# Patient Record
Sex: MALE | Race: WHITE
[De-identification: names, ages, dates, MRNs, and addresses within clinical notes are randomized per-mention and may not be internally consistent; named-entity substitution may affect disease eponyms.]

---

## 2018-08-07 PROBLEM — Z00.00 ENCOUNTER FOR PREVENTIVE HEALTH EXAMINATION: Status: ACTIVE | Noted: 2018-08-07

## 2018-08-30 ENCOUNTER — RX RENEWAL (OUTPATIENT)
Age: 73
End: 2018-08-30

## 2018-09-11 ENCOUNTER — RX RENEWAL (OUTPATIENT)
Age: 73
End: 2018-09-11

## 2018-10-08 ENCOUNTER — APPOINTMENT (OUTPATIENT)
Dept: ENDOCRINOLOGY | Facility: CLINIC | Age: 73
End: 2018-10-08
Payer: MEDICARE

## 2018-10-08 VITALS
WEIGHT: 211 LBS | OXYGEN SATURATION: 98 % | HEART RATE: 62 BPM | SYSTOLIC BLOOD PRESSURE: 120 MMHG | HEIGHT: 69 IN | BODY MASS INDEX: 31.25 KG/M2 | DIASTOLIC BLOOD PRESSURE: 70 MMHG

## 2018-10-08 DIAGNOSIS — I25.10 ATHEROSCLEROTIC HEART DISEASE OF NATIVE CORONARY ARTERY W/OUT ANGINA PECTORIS: ICD-10-CM

## 2018-10-08 LAB — GLUCOSE BLDC GLUCOMTR-MCNC: 127

## 2018-10-08 PROCEDURE — 99214 OFFICE O/P EST MOD 30 MIN: CPT

## 2018-10-17 ENCOUNTER — RX RENEWAL (OUTPATIENT)
Age: 73
End: 2018-10-17

## 2018-10-18 ENCOUNTER — RX RENEWAL (OUTPATIENT)
Age: 73
End: 2018-10-18

## 2018-10-28 ENCOUNTER — RX RENEWAL (OUTPATIENT)
Age: 73
End: 2018-10-28

## 2019-01-10 ENCOUNTER — RECORD ABSTRACTING (OUTPATIENT)
Age: 74
End: 2019-01-10

## 2019-01-10 DIAGNOSIS — Z86.39 PERSONAL HISTORY OF OTHER ENDOCRINE, NUTRITIONAL AND METABOLIC DISEASE: ICD-10-CM

## 2019-01-10 DIAGNOSIS — Z87.39 PERSONAL HISTORY OF OTHER DISEASES OF THE MUSCULOSKELETAL SYSTEM AND CONNECTIVE TISSUE: ICD-10-CM

## 2019-01-10 DIAGNOSIS — I51.9 HEART DISEASE, UNSPECIFIED: ICD-10-CM

## 2019-01-17 ENCOUNTER — APPOINTMENT (OUTPATIENT)
Dept: ENDOCRINOLOGY | Facility: CLINIC | Age: 74
End: 2019-01-17
Payer: MEDICARE

## 2019-01-17 VITALS
WEIGHT: 212 LBS | HEIGHT: 69 IN | SYSTOLIC BLOOD PRESSURE: 120 MMHG | BODY MASS INDEX: 31.4 KG/M2 | DIASTOLIC BLOOD PRESSURE: 70 MMHG

## 2019-01-17 LAB
GLUCOSE BLDC GLUCOMTR-MCNC: 191
HBA1C MFR BLD HPLC: 6.9
LDLC SERPL DIRECT ASSAY-MCNC: 49
MICROALBUMIN/CREAT 24H UR-RTO: <0.2

## 2019-01-17 PROCEDURE — 82962 GLUCOSE BLOOD TEST: CPT

## 2019-01-17 PROCEDURE — 99214 OFFICE O/P EST MOD 30 MIN: CPT | Mod: 25

## 2019-01-17 NOTE — HISTORY OF PRESENT ILLNESS
[FreeTextEntry1] : Quality:  Type 2.   \par Severity:  Moderate\par Duration:  11 years\par Associated Symptoms:   \par Notes:  Current regimen:\par 	glipizide ER 5 daily\par 	metformin  4/day\par 	victoza 1.8 - tried to stop victoza but did not feel well\par 	humalog variable dosing; around 10 units before meals (5 pens every 6 weeks)\par \par elevated glucose overnight; somewhat improved when injecting humalog HS\par \par Blood Glucose Testing Information \par \par Patient is using the  meter and is testing 6-7 times per day.\par \par \par Past Medical History\par Arthritis. \par Notes:  ASHD, CABG, MI. Low ejection fraction; advised to have defibrillator (refused by pt). Ejection fraction improving.\par hypothyroid on .137 daily\par BPH\par has cut down on quinapril from 20 bid to 1/2 of a 20 mg pill once daily\par

## 2019-01-17 NOTE — REVIEW OF SYSTEMS
[Chest Pain] : chest pain [Shortness Of Breath] : shortness of breath [FreeTextEntry5] : following up with cardiology [FreeTextEntry6] : with cold weather

## 2019-01-24 ENCOUNTER — RX RENEWAL (OUTPATIENT)
Age: 74
End: 2019-01-24

## 2019-02-04 ENCOUNTER — RX RENEWAL (OUTPATIENT)
Age: 74
End: 2019-02-04

## 2019-03-18 ENCOUNTER — RX RENEWAL (OUTPATIENT)
Age: 74
End: 2019-03-18

## 2019-04-08 ENCOUNTER — RX RENEWAL (OUTPATIENT)
Age: 74
End: 2019-04-08

## 2019-04-08 DIAGNOSIS — I25.10 ATHEROSCLEROTIC HEART DISEASE OF NATIVE CORONARY ARTERY W/OUT ANGINA PECTORIS: ICD-10-CM

## 2019-04-08 DIAGNOSIS — N40.0 BENIGN PROSTATIC HYPERPLASIA WITHOUT LOWER URINARY TRACT SYMPMS: ICD-10-CM

## 2019-05-10 ENCOUNTER — APPOINTMENT (OUTPATIENT)
Dept: ENDOCRINOLOGY | Facility: CLINIC | Age: 74
End: 2019-05-10
Payer: MEDICARE

## 2019-05-10 VITALS
SYSTOLIC BLOOD PRESSURE: 120 MMHG | BODY MASS INDEX: 31.4 KG/M2 | WEIGHT: 212 LBS | HEIGHT: 69 IN | DIASTOLIC BLOOD PRESSURE: 60 MMHG | HEART RATE: 64 BPM

## 2019-05-10 LAB
GLUCOSE BLDC GLUCOMTR-MCNC: 266
HBA1C MFR BLD HPLC: 6.5
LDLC SERPL CALC-MCNC: 42

## 2019-05-10 PROCEDURE — 99214 OFFICE O/P EST MOD 30 MIN: CPT | Mod: 25

## 2019-05-10 PROCEDURE — 82962 GLUCOSE BLOOD TEST: CPT

## 2019-05-10 NOTE — REVIEW OF SYSTEMS
[Fatigue] : fatigue [Lower Ext Edema] : no lower extremity edema [Shortness Of Breath] : no shortness of breath

## 2019-07-01 ENCOUNTER — RX RENEWAL (OUTPATIENT)
Age: 74
End: 2019-07-01

## 2019-07-16 ENCOUNTER — RX RENEWAL (OUTPATIENT)
Age: 74
End: 2019-07-16

## 2019-09-06 LAB
HBA1C MFR BLD HPLC: 6.5
LDLC SERPL DIRECT ASSAY-MCNC: 46
MICROALBUMIN/CREAT 24H UR-RTO: 4

## 2019-09-09 ENCOUNTER — APPOINTMENT (OUTPATIENT)
Dept: ENDOCRINOLOGY | Facility: CLINIC | Age: 74
End: 2019-09-09
Payer: MEDICARE

## 2019-09-09 VITALS
SYSTOLIC BLOOD PRESSURE: 100 MMHG | WEIGHT: 214 LBS | DIASTOLIC BLOOD PRESSURE: 74 MMHG | BODY MASS INDEX: 31.7 KG/M2 | OXYGEN SATURATION: 99 % | HEIGHT: 69 IN | HEART RATE: 72 BPM

## 2019-09-09 LAB — GLUCOSE BLDC GLUCOMTR-MCNC: 214

## 2019-09-09 PROCEDURE — 99214 OFFICE O/P EST MOD 30 MIN: CPT | Mod: 25

## 2019-09-09 PROCEDURE — 82962 GLUCOSE BLOOD TEST: CPT

## 2019-09-09 RX ORDER — GLIPIZIDE 5 MG/1
5 TABLET, EXTENDED RELEASE ORAL
Refills: 0 | Status: DISCONTINUED | COMMUNITY
End: 2019-09-09

## 2019-09-09 RX ORDER — INSULIN LISPRO 100 [IU]/ML
100 INJECTION, SOLUTION INTRAVENOUS; SUBCUTANEOUS
Qty: 15 | Refills: 3 | Status: DISCONTINUED | COMMUNITY
Start: 2018-10-17 | End: 2019-09-09

## 2019-09-09 NOTE — ASSESSMENT
[FreeTextEntry1] : DM type 2, treated with insulin, orals, and glp-1, well controlled with no severe hypoglycemia\par Hyperlipidemia, with triglyceride elevation but good control of LDL. \par Hypothyroid, euthyroid on replacement\par \par

## 2019-09-09 NOTE — HISTORY OF PRESENT ILLNESS
[FreeTextEntry1] : Quality:  Type 2.   \par Severity:  Moderate\par Duration:  11 years\par Associated Symptoms:   \par Notes:  Current regimen:\par 	glipizide ER 5 daily\par 	metformin  4/day\par 	victoza 1.8 - tried to stop victoza but did not feel well\par 	humalog variable dosing; around 10 units before meals (5 pens every 6 weeks)\par \par elevated glucose overnight; somewhat improved when injecting humalog HS\par \par Blood Glucose Testing Information \par \par Patient is using the  meter and is testing 6-7 times per day. Not interested in a valerio device.\par \par \par Past Medical History\par Arthritis. \par Notes:  ASHD, CABG, MI. Low ejection fraction; advised to have defibrillator (refused by pt). Ejection fraction improving. Followed by Dr. Odell\par hypothyroid on .137 daily\par BPH\par has cut down on quinapril from 20 bid to 1/2 of a 20 mg pill once daily\par

## 2019-11-20 ENCOUNTER — RX RENEWAL (OUTPATIENT)
Age: 74
End: 2019-11-20

## 2019-12-05 ENCOUNTER — APPOINTMENT (OUTPATIENT)
Dept: ENDOCRINOLOGY | Facility: CLINIC | Age: 74
End: 2019-12-05
Payer: MEDICARE

## 2019-12-05 ENCOUNTER — RESULT CHARGE (OUTPATIENT)
Age: 74
End: 2019-12-05

## 2019-12-05 VITALS
HEART RATE: 65 BPM | WEIGHT: 218 LBS | HEIGHT: 69 IN | DIASTOLIC BLOOD PRESSURE: 64 MMHG | BODY MASS INDEX: 32.29 KG/M2 | OXYGEN SATURATION: 98 % | SYSTOLIC BLOOD PRESSURE: 110 MMHG

## 2019-12-05 LAB
GLUCOSE BLDC GLUCOMTR-MCNC: 190
HBA1C MFR BLD HPLC: 6.9
LDLC SERPL CALC-MCNC: 54
MICROALBUMIN/CREAT 24H UR-RTO: 8

## 2019-12-05 PROCEDURE — 99214 OFFICE O/P EST MOD 30 MIN: CPT | Mod: 25

## 2019-12-05 PROCEDURE — 82962 GLUCOSE BLOOD TEST: CPT

## 2019-12-05 NOTE — ASSESSMENT
[FreeTextEntry1] : DM type 2, treated with insulin, orals, and glp-1, well controlled with no severe hypoglycemia. Advised increased exercise as tolerated\par Hyperlipidemia, with triglyceride elevation but good control of LDL. \par Hypothyroid, euthyroid on replacement\par \par

## 2019-12-05 NOTE — REVIEW OF SYSTEMS
[Recent Weight Gain (___ Lbs)] : recent [unfilled] ~Ulb weight gain [Chest Pain] : no chest pain [Shortness Of Breath] : no shortness of breath

## 2019-12-23 ENCOUNTER — RX RENEWAL (OUTPATIENT)
Age: 74
End: 2019-12-23

## 2020-03-09 ENCOUNTER — APPOINTMENT (OUTPATIENT)
Dept: ENDOCRINOLOGY | Facility: CLINIC | Age: 75
End: 2020-03-09
Payer: MEDICARE

## 2020-03-09 VITALS
SYSTOLIC BLOOD PRESSURE: 110 MMHG | BODY MASS INDEX: 31.99 KG/M2 | DIASTOLIC BLOOD PRESSURE: 70 MMHG | HEART RATE: 64 BPM | WEIGHT: 216 LBS | HEIGHT: 69 IN

## 2020-03-09 LAB
GLUCOSE BLDC GLUCOMTR-MCNC: 194
HBA1C MFR BLD HPLC: 6.7
LDLC SERPL DIRECT ASSAY-MCNC: 44

## 2020-03-09 PROCEDURE — 99214 OFFICE O/P EST MOD 30 MIN: CPT | Mod: 25

## 2020-03-09 PROCEDURE — 82962 GLUCOSE BLOOD TEST: CPT

## 2020-03-09 NOTE — REVIEW OF SYSTEMS
[Chest Pain] : chest pain [Shortness Of Breath] : no shortness of breath [FreeTextEntry5] : chronic stable angina

## 2020-03-09 NOTE — HISTORY OF PRESENT ILLNESS
[FreeTextEntry1] : Quality:  Type 2.   \par Severity:  Moderate\par Duration:  12 years\par Associated Symptoms:   \par Notes:  Current regimen:\par 	glipizide ER 5 daily\par 	metformin  4/day\par 	victoza 1.8 - tried to stop victoza but did not feel well\par 	humalog variable dosing; around 10 units before meals (5 pens every 6 weeks)\par \par elevated glucose overnight; somewhat improved when injecting humalog HS\par \par Blood Glucose Testing Information \par \par Patient is using the  meter and is testing 6-7 times per day. Not interested in a valerio device.\par \par \par Past Medical History\par Arthritis. \par Notes:  ASHD, CABG, MI. Low ejection fraction; advised to have defibrillator (refused by pt). Ejection fraction improving. Followed by Dr. Odell\par hypothyroid on .137 daily\par BPH\par has cut down on quinapril from 20 bid to 1/2 of a 20 mg pill once daily\par

## 2020-03-09 NOTE — ASSESSMENT
[FreeTextEntry1] : DM type 2, treated with insulin, orals, and glp-1, well controlled with no severe hypoglycemia.\par Hyperlipidemia, with triglyceride elevation but good control of LDL. \par Hypothyroid, euthyroid on replacement\par \par

## 2020-03-18 ENCOUNTER — RX RENEWAL (OUTPATIENT)
Age: 75
End: 2020-03-18

## 2020-06-01 ENCOUNTER — RX RENEWAL (OUTPATIENT)
Age: 75
End: 2020-06-01

## 2020-06-09 ENCOUNTER — APPOINTMENT (OUTPATIENT)
Dept: ENDOCRINOLOGY | Facility: CLINIC | Age: 75
End: 2020-06-09
Payer: MEDICARE

## 2020-06-09 PROCEDURE — 36415 COLL VENOUS BLD VENIPUNCTURE: CPT

## 2020-06-10 LAB
ALBUMIN SERPL ELPH-MCNC: 4.7 G/DL
ALP BLD-CCNC: 93 U/L
ALT SERPL-CCNC: 14 U/L
ANION GAP SERPL CALC-SCNC: 13 MMOL/L
AST SERPL-CCNC: 14 U/L
BASOPHILS # BLD AUTO: 0.07 K/UL
BASOPHILS NFR BLD AUTO: 0.9 %
BILIRUB SERPL-MCNC: 0.3 MG/DL
BUN SERPL-MCNC: 14 MG/DL
CALCIUM SERPL-MCNC: 9.4 MG/DL
CHLORIDE SERPL-SCNC: 98 MMOL/L
CHOLEST SERPL-MCNC: 152 MG/DL
CHOLEST/HDLC SERPL: 2.2 RATIO
CO2 SERPL-SCNC: 25 MMOL/L
CREAT SERPL-MCNC: 1.01 MG/DL
CREAT SPEC-SCNC: 163 MG/DL
EOSINOPHIL # BLD AUTO: 0.08 K/UL
EOSINOPHIL NFR BLD AUTO: 1 %
ESTIMATED AVERAGE GLUCOSE: 140 MG/DL
GLUCOSE SERPL-MCNC: 168 MG/DL
HBA1C MFR BLD HPLC: 6.5 %
HCT VFR BLD CALC: 39.3 %
HDLC SERPL-MCNC: 70 MG/DL
HGB BLD-MCNC: 12 G/DL
IMM GRANULOCYTES NFR BLD AUTO: 0.4 %
LDLC SERPL CALC-MCNC: 38 MG/DL
LYMPHOCYTES # BLD AUTO: 2.23 K/UL
LYMPHOCYTES NFR BLD AUTO: 28.1 %
MAN DIFF?: NORMAL
MCHC RBC-ENTMCNC: 30.4 PG
MCHC RBC-ENTMCNC: 30.5 GM/DL
MCV RBC AUTO: 99.5 FL
MICROALBUMIN 24H UR DL<=1MG/L-MCNC: <1.2 MG/DL
MICROALBUMIN/CREAT 24H UR-RTO: NORMAL MG/G
MONOCYTES # BLD AUTO: 0.51 K/UL
MONOCYTES NFR BLD AUTO: 6.4 %
NEUTROPHILS # BLD AUTO: 5.02 K/UL
NEUTROPHILS NFR BLD AUTO: 63.2 %
PLATELET # BLD AUTO: 234 K/UL
POTASSIUM SERPL-SCNC: 5.4 MMOL/L
PROT SERPL-MCNC: 6.5 G/DL
RBC # BLD: 3.95 M/UL
RBC # FLD: 13.7 %
SODIUM SERPL-SCNC: 137 MMOL/L
TRIGL SERPL-MCNC: 219 MG/DL
TSH SERPL-ACNC: 1 UIU/ML
WBC # FLD AUTO: 7.94 K/UL

## 2020-08-17 ENCOUNTER — RX RENEWAL (OUTPATIENT)
Age: 75
End: 2020-08-17

## 2020-09-08 ENCOUNTER — APPOINTMENT (OUTPATIENT)
Dept: ENDOCRINOLOGY | Facility: CLINIC | Age: 75
End: 2020-09-08
Payer: MEDICARE

## 2020-09-08 PROCEDURE — 36415 COLL VENOUS BLD VENIPUNCTURE: CPT

## 2020-09-09 ENCOUNTER — LABORATORY RESULT (OUTPATIENT)
Age: 75
End: 2020-09-09

## 2020-09-09 LAB
ALBUMIN SERPL ELPH-MCNC: 4.6 G/DL
ALP BLD-CCNC: 99 U/L
ALT SERPL-CCNC: 17 U/L
ANION GAP SERPL CALC-SCNC: 12 MMOL/L
AST SERPL-CCNC: 17 U/L
BASOPHILS # BLD AUTO: 0.05 K/UL
BASOPHILS NFR BLD AUTO: 0.6 %
BILIRUB SERPL-MCNC: 0.4 MG/DL
BUN SERPL-MCNC: 15 MG/DL
CALCIUM SERPL-MCNC: 9.1 MG/DL
CHLORIDE SERPL-SCNC: 101 MMOL/L
CHOLEST SERPL-MCNC: 162 MG/DL
CHOLEST/HDLC SERPL: 2 RATIO
CO2 SERPL-SCNC: 25 MMOL/L
CREAT SERPL-MCNC: 1.04 MG/DL
EOSINOPHIL # BLD AUTO: 0.1 K/UL
EOSINOPHIL NFR BLD AUTO: 1.2 %
ESTIMATED AVERAGE GLUCOSE: 137 MG/DL
GLUCOSE SERPL-MCNC: 139 MG/DL
HBA1C MFR BLD HPLC: 6.4 %
HCT VFR BLD CALC: 41.8 %
HDLC SERPL-MCNC: 81 MG/DL
HGB BLD-MCNC: 12.6 G/DL
IMM GRANULOCYTES NFR BLD AUTO: 0.4 %
LDLC SERPL CALC-MCNC: 43 MG/DL
LYMPHOCYTES # BLD AUTO: 2.59 K/UL
LYMPHOCYTES NFR BLD AUTO: 30.8 %
MAN DIFF?: NORMAL
MCHC RBC-ENTMCNC: 30 PG
MCHC RBC-ENTMCNC: 30.1 GM/DL
MCV RBC AUTO: 99.5 FL
MONOCYTES # BLD AUTO: 0.56 K/UL
MONOCYTES NFR BLD AUTO: 6.7 %
NEUTROPHILS # BLD AUTO: 5.08 K/UL
NEUTROPHILS NFR BLD AUTO: 60.3 %
PLATELET # BLD AUTO: 208 K/UL
POTASSIUM SERPL-SCNC: 4.9 MMOL/L
PROT SERPL-MCNC: 6.7 G/DL
PSA SERPL-MCNC: 1.31 NG/ML
RBC # BLD: 4.2 M/UL
RBC # FLD: 13.5 %
SODIUM SERPL-SCNC: 138 MMOL/L
T3RU NFR SERPL: 0.9 TBI
T4 SERPL-MCNC: 7.1 UG/DL
TRIGL SERPL-MCNC: 192 MG/DL
TSH SERPL-ACNC: 1.48 UIU/ML
WBC # FLD AUTO: 8.41 K/UL

## 2020-09-21 ENCOUNTER — APPOINTMENT (OUTPATIENT)
Dept: ENDOCRINOLOGY | Facility: CLINIC | Age: 75
End: 2020-09-21
Payer: MEDICARE

## 2020-09-21 VITALS
BODY MASS INDEX: 31.84 KG/M2 | HEART RATE: 70 BPM | HEIGHT: 69 IN | WEIGHT: 215 LBS | SYSTOLIC BLOOD PRESSURE: 110 MMHG | DIASTOLIC BLOOD PRESSURE: 74 MMHG

## 2020-09-21 LAB — GLUCOSE BLDC GLUCOMTR-MCNC: 219

## 2020-09-21 PROCEDURE — 99214 OFFICE O/P EST MOD 30 MIN: CPT | Mod: 25

## 2020-09-21 PROCEDURE — 82962 GLUCOSE BLOOD TEST: CPT

## 2020-09-21 NOTE — PHYSICAL EXAM
[Clear to Auscultation] : lungs were clear to auscultation bilaterally [Normal Rate] : heart rate was normal

## 2020-09-21 NOTE — HISTORY OF PRESENT ILLNESS
[FreeTextEntry1] : Quality:  Type 2.   \par Severity:  Moderate\par Duration:  12 years\par Associated Symptoms:   \par Notes:  Current regimen:\par 	glipizide ER 5 daily\par 	metformin  4/day\par 	victoza 1.8 - tried to stop victoza but did not feel well\par 	humalog variable dosing; around 10 units before meals (5 pens every 6 weeks)\par \par elevated glucose overnight; somewhat improved when injecting humalog HS\par \par Blood Glucose Testing Information \par \par Patient is using the  meter and is testing 6-7 times per day. Usiing a valerio, which appears to read lower than fingersticks - however using outdated strips\par \par \par Past Medical History\par Arthritis. \par Notes:  ASHD, CABG, MI. Low ejection fraction; advised to have defibrillator (refused by pt). Ejection fraction improving. Followed by Dr. Odell\par hypothyroid on .137 daily\par BPH\par has cut down on quinapril from 20 bid to 1/2 of a 20 mg pill once daily\par

## 2020-09-21 NOTE — REVIEW OF SYSTEMS
[Chest Pain] : chest pain [Shortness Of Breath] : no shortness of breath [FreeTextEntry5] : stable anginal pattern

## 2020-09-21 NOTE — ASSESSMENT
[FreeTextEntry1] : DM type 2, treated with insulin, orals, and glp-1, well controlled with no severe hypoglycemia. decrease lunch injection advised\par Hyperlipidemia, with triglyceride elevation but good control of LDL. \par Hypothyroid, euthyroid on replacement\par \par

## 2020-11-05 ENCOUNTER — RX RENEWAL (OUTPATIENT)
Age: 75
End: 2020-11-05

## 2020-12-16 ENCOUNTER — APPOINTMENT (OUTPATIENT)
Dept: ENDOCRINOLOGY | Facility: CLINIC | Age: 75
End: 2020-12-16

## 2021-02-05 ENCOUNTER — RX RENEWAL (OUTPATIENT)
Age: 76
End: 2021-02-05

## 2021-02-11 ENCOUNTER — RX RENEWAL (OUTPATIENT)
Age: 76
End: 2021-02-11

## 2021-02-25 ENCOUNTER — RX RENEWAL (OUTPATIENT)
Age: 76
End: 2021-02-25

## 2021-03-23 LAB
HBA1C MFR BLD HPLC: 6.4
LDLC SERPL DIRECT ASSAY-MCNC: 62
MICROALBUMIN/CREAT 24H UR-RTO: 4

## 2021-03-24 ENCOUNTER — APPOINTMENT (OUTPATIENT)
Dept: ENDOCRINOLOGY | Facility: CLINIC | Age: 76
End: 2021-03-24
Payer: MEDICARE

## 2021-03-24 VITALS
WEIGHT: 220 LBS | BODY MASS INDEX: 32.58 KG/M2 | HEART RATE: 74 BPM | DIASTOLIC BLOOD PRESSURE: 60 MMHG | OXYGEN SATURATION: 99 % | SYSTOLIC BLOOD PRESSURE: 118 MMHG | HEIGHT: 69 IN

## 2021-03-24 LAB — GLUCOSE BLDC GLUCOMTR-MCNC: 179

## 2021-03-24 PROCEDURE — 82962 GLUCOSE BLOOD TEST: CPT

## 2021-03-24 PROCEDURE — 99214 OFFICE O/P EST MOD 30 MIN: CPT | Mod: 25

## 2021-03-24 PROCEDURE — 99072 ADDL SUPL MATRL&STAF TM PHE: CPT

## 2021-03-24 NOTE — ASSESSMENT
[FreeTextEntry1] : DM type 2, treated with insulin, orals, and glp-1, well controlled with no severe hypoglycemia. \par Hyperlipidemia, with triglyceride elevation but good control of LDL. \par Hypothyroid, euthyroid on replacement\par \par

## 2021-03-24 NOTE — HISTORY OF PRESENT ILLNESS
[FreeTextEntry1] : Quality:  Type 2.   \par Severity:  Moderate\par Duration:  13 years\par Associated Symptoms:   \par Notes:  Current regimen:\par 	glipizide ER 5 daily\par 	metformin  4/day\par 	victoza 1.8 - tried to stop victoza but did not feel well\par 	humalog variable dosing; around 10-25 units before meals (5 pens every 6 weeks)\par \par elevated glucose overnight; somewhat improved when injecting humalog HS\par \par Blood Glucose Testing Information \par \par Patient is using the  meter and is testing 6-7 times per day. Usiing a valerio, which appears to read lower than fingersticks - however using outdated strips\par \par \par Past Medical History\par Arthritis. \par Notes:  ASHD, CABG, MI. Low ejection fraction; advised to have defibrillator (refused by pt). Ejection fraction improving. Followed by Dr. Odell\par hypothyroid on .137 daily\par BPH\par has cut down on quinapril from 20 bid to 1/2 of a 20 mg pill once daily\par

## 2021-03-29 ENCOUNTER — RX RENEWAL (OUTPATIENT)
Age: 76
End: 2021-03-29

## 2021-04-13 ENCOUNTER — RX RENEWAL (OUTPATIENT)
Age: 76
End: 2021-04-13

## 2021-06-28 ENCOUNTER — RX RENEWAL (OUTPATIENT)
Age: 76
End: 2021-06-28

## 2021-06-28 RX ORDER — NADOLOL 40 MG/1
40 TABLET ORAL
Qty: 90 | Refills: 3 | Status: ACTIVE | COMMUNITY
Start: 2019-01-24 | End: 1900-01-01

## 2021-07-23 LAB
HBA1C MFR BLD HPLC: 5.7
LDLC SERPL DIRECT ASSAY-MCNC: 51

## 2021-07-26 ENCOUNTER — APPOINTMENT (OUTPATIENT)
Dept: ENDOCRINOLOGY | Facility: CLINIC | Age: 76
End: 2021-07-26
Payer: MEDICARE

## 2021-07-26 VITALS
WEIGHT: 220 LBS | HEIGHT: 69 IN | HEART RATE: 78 BPM | OXYGEN SATURATION: 98 % | BODY MASS INDEX: 32.58 KG/M2 | SYSTOLIC BLOOD PRESSURE: 110 MMHG | DIASTOLIC BLOOD PRESSURE: 64 MMHG

## 2021-07-26 LAB — GLUCOSE BLDC GLUCOMTR-MCNC: 125

## 2021-07-26 PROCEDURE — 99214 OFFICE O/P EST MOD 30 MIN: CPT | Mod: 25

## 2021-07-26 PROCEDURE — 95251 CONT GLUC MNTR ANALYSIS I&R: CPT

## 2021-07-26 PROCEDURE — 99072 ADDL SUPL MATRL&STAF TM PHE: CPT

## 2021-07-26 PROCEDURE — 82962 GLUCOSE BLOOD TEST: CPT

## 2021-07-26 NOTE — ASSESSMENT
[FreeTextEntry1] : DM type 2, treated with insulin, orals, and glp-1, well controlled with no severe hypoglycemia. ADvised more cautious use of insulin\par Hyperlipidemia, with triglyceride elevation but good control of LDL. \par Hypothyroid, euthyroid on replacement\par \par

## 2021-07-26 NOTE — HISTORY OF PRESENT ILLNESS
[Continuous Glucose Monitoring] : Continuous Glucose Monitoring: Yes [Isabella] : Isabella [FreeTextEntry1] : Quality:  Type 2.   \par Severity:  Moderate\par Duration:  13 years\par Associated Symptoms:   \par Notes:  Current regimen:\par 	glipizide ER 5 daily\par 	metformin  4/day\par 	victoza 1.8 - tried to stop victoza but did not feel well\par 	humalog variable dosing; around 10-25 units before meals (5 pens every 6 weeks)\par \par elevated glucose overnight; somewhat improved when injecting humalog HS\par \par Blood Glucose Testing Information \par \par Patient is using the  meter and is testing 6-7 times per day. Usiing a valerio, which appears to read lower than fingersticks - however using outdated strips\par \par \par Past Medical History\par Arthritis. \par Notes:  ASHD, CABG, MI. Low ejection fraction; advised to have defibrillator (refused by pt). Ejection fraction improving. Followed by Dr. Odell\par hypothyroid on .137 daily\par BPH\par has cut down on quinapril from 20 bid to 1/2 of a 20 mg pill once daily\par  [FreeTextEntry2] : 83 [FreeTextEntry3] : 13 [FreeTextEntry4] : 4 [FreeTextEntry5] : 137 [de-identified] : 6.6 [FreeTextEntry6] : 31

## 2021-07-27 NOTE — HISTORY OF PRESENT ILLNESS
[FreeTextEntry1] : Quality:  Type 2.   \par Severity:  Moderate\par Duration:  11 years\par Associated Symptoms:   \par Notes:  Current regimen:\par 	glipizide ER 5 daily\par 	metformin  4/day\par 	victoza 1.8 - tried to stop victoza but did not feel well\par 	humalog variable dosing; around 10 units before meals (5 pens every 6 weeks)\par \par elevated glucose overnight; somewhat improved when injecting humalog HS\par \par Blood Glucose Testing Information \par \par Patient is using the  meter and is testing 6-7 times per day. Not interested in a valerio device.\par \par \par Past Medical History\par Arthritis. \par Notes:  ASHD, CABG, MI. Low ejection fraction; advised to have defibrillator (refused by pt). Ejection fraction improving. Followed by Dr. Odell\par hypothyroid on .137 daily\par BPH\par has cut down on quinapril from 20 bid to 1/2 of a 20 mg pill once daily\par  27-Jul-2021 05:43

## 2021-08-04 ENCOUNTER — RX RENEWAL (OUTPATIENT)
Age: 76
End: 2021-08-04

## 2021-11-05 LAB
HBA1C MFR BLD HPLC: 5.9
LDLC SERPL DIRECT ASSAY-MCNC: 50
MICROALBUMIN/CREAT 24H UR-RTO: 5

## 2021-11-08 ENCOUNTER — APPOINTMENT (OUTPATIENT)
Dept: ENDOCRINOLOGY | Facility: CLINIC | Age: 76
End: 2021-11-08
Payer: MEDICARE

## 2021-11-08 VITALS
SYSTOLIC BLOOD PRESSURE: 120 MMHG | HEART RATE: 95 BPM | WEIGHT: 225 LBS | DIASTOLIC BLOOD PRESSURE: 70 MMHG | HEIGHT: 69 IN | BODY MASS INDEX: 33.33 KG/M2 | OXYGEN SATURATION: 99 %

## 2021-11-08 LAB — GLUCOSE BLDC GLUCOMTR-MCNC: 185

## 2021-11-08 PROCEDURE — 99214 OFFICE O/P EST MOD 30 MIN: CPT | Mod: 25

## 2021-11-08 PROCEDURE — 82962 GLUCOSE BLOOD TEST: CPT

## 2021-11-08 NOTE — HISTORY OF PRESENT ILLNESS
[FreeTextEntry1] : Quality:  Type 2.   \par Severity:  Moderate\par Duration:  13 years\par Associated Symptoms:   \par Notes:  Current regimen:\par 	glipizide ER 5 daily\par 	metformin  4/day\par 	victoza 1.8 - tried to stop victoza but did not feel well\par 	humalog variable dosing; around 10-18 units before meals (5 pens every 6 weeks)\par \par elevated glucose overnight; somewhat improved when injecting humalog HS\par \par Blood Glucose Testing Information \par \par Patient is using the  meter and is testing 6-7 times per day. Usiing a valerio, which appears to read lower than fingersticks - however using outdated strips\par \par \par Past Medical History\par Arthritis. \par Notes:  ASHD, CABG, MI. Low ejection fraction; advised to have defibrillator (refused by pt). Ejection fraction improving. Followed by Dr. Odell\par hypothyroid on .137 daily\par BPH\par has cut down on quinapril from 20 bid to 1/2 of a 20 mg pill once daily\par

## 2021-11-15 ENCOUNTER — RX RENEWAL (OUTPATIENT)
Age: 76
End: 2021-11-15

## 2022-01-20 ENCOUNTER — RX RENEWAL (OUTPATIENT)
Age: 77
End: 2022-01-20

## 2022-01-25 ENCOUNTER — RX RENEWAL (OUTPATIENT)
Age: 77
End: 2022-01-25

## 2022-02-20 ENCOUNTER — RX RENEWAL (OUTPATIENT)
Age: 77
End: 2022-02-20

## 2022-03-18 LAB
HBA1C MFR BLD HPLC: 6.2
LDLC SERPL CALC-MCNC: 39
MICROALBUMIN/CREAT 24H UR-RTO: 4

## 2022-03-21 ENCOUNTER — APPOINTMENT (OUTPATIENT)
Dept: ENDOCRINOLOGY | Facility: CLINIC | Age: 77
End: 2022-03-21
Payer: MEDICARE

## 2022-03-21 VITALS
SYSTOLIC BLOOD PRESSURE: 106 MMHG | HEART RATE: 59 BPM | DIASTOLIC BLOOD PRESSURE: 68 MMHG | BODY MASS INDEX: 30.07 KG/M2 | OXYGEN SATURATION: 98 % | HEIGHT: 69 IN | WEIGHT: 203 LBS

## 2022-03-21 LAB — GLUCOSE BLDC GLUCOMTR-MCNC: 164

## 2022-03-21 PROCEDURE — 99214 OFFICE O/P EST MOD 30 MIN: CPT | Mod: 25

## 2022-03-21 PROCEDURE — 95251 CONT GLUC MNTR ANALYSIS I&R: CPT

## 2022-03-21 NOTE — ASSESSMENT
[FreeTextEntry1] : DM type 2, treated with insulin, orals, and glp-1, well controlled with no severe hypoglycemia. \par Hyperlipidemia, controlled\par Hypothyroid, euthyroid on replacement\par \par

## 2022-03-21 NOTE — HISTORY OF PRESENT ILLNESS
[FreeTextEntry1] : Quality:  Type 2.   \par Severity:  Moderate (MORD) IR 2.1\par Duration:  14 years\par Associated Symptoms:   \par Notes:  Current regimen:\par 	glipizide ER 5 daily\par 	metformin  4/day\par 	victoza 1.8 - tried to stop victoza but did not feel well\par 	humalog variable dosing; around 10-18 units before meals (5 pens every 6 weeks) - now requiring lower amounts\par \par elevated glucose overnight; somewhat improved when injecting humalog HS\par \par Blood Glucose Testing Information \par \par Patient is using the  meter and is testing 6-7 times per day. Using a valerio,\par \par \par Past Medical History\par Arthritis. \par Notes:  ASHD, CABG, MI. Low ejection fraction; advised to have defibrillator (refused by pt). Ejection fraction improving. Followed by Dr. Odell\par hypothyroid on .137 daily\par BPH\par has cut down on quinapril from 20 bid to 1/2 of a 20 mg pill once daily\par  [Continuous Glucose Monitoring] : Continuous Glucose Monitoring: Yes [Isabella] : Isabella [FreeTextEntry2] : 95 [FreeTextEntry3] : 3 [FreeTextEntry4] : 2 [de-identified] : 6.1 [FreeTextEntry5] : 116 [FreeTextEntry6] : 24.4

## 2022-03-25 ENCOUNTER — RX RENEWAL (OUTPATIENT)
Age: 77
End: 2022-03-25

## 2022-06-15 ENCOUNTER — RX RENEWAL (OUTPATIENT)
Age: 77
End: 2022-06-15

## 2022-07-07 LAB
HBA1C MFR BLD HPLC: 5.9
LDLC SERPL DIRECT ASSAY-MCNC: 33
MICROALBUMIN/CREAT UR-RTO: 8

## 2022-07-11 ENCOUNTER — APPOINTMENT (OUTPATIENT)
Dept: ENDOCRINOLOGY | Facility: CLINIC | Age: 77
End: 2022-07-11

## 2022-07-11 VITALS
WEIGHT: 212 LBS | HEIGHT: 69 IN | BODY MASS INDEX: 31.4 KG/M2 | OXYGEN SATURATION: 97 % | HEART RATE: 64 BPM | DIASTOLIC BLOOD PRESSURE: 70 MMHG | SYSTOLIC BLOOD PRESSURE: 130 MMHG

## 2022-07-11 DIAGNOSIS — D64.9 ANEMIA, UNSPECIFIED: ICD-10-CM

## 2022-07-11 LAB — GLUCOSE BLDC GLUCOMTR-MCNC: 181

## 2022-07-11 PROCEDURE — 82962 GLUCOSE BLOOD TEST: CPT

## 2022-07-11 PROCEDURE — 99214 OFFICE O/P EST MOD 30 MIN: CPT | Mod: 25

## 2022-07-11 NOTE — ASSESSMENT
[FreeTextEntry1] : DM type 2, treated with insulin, orals, and glp-1, well controlled with no severe hypoglycemia. OK to reduce metformin to 3/day; pt. hardly using insulin\par Hyperlipidemia, controlled\par Hypothyroid, euthyroid on replacement\par HCt lower than usual; additional labs ordered to evaluate etiology\par \par

## 2022-07-11 NOTE — HISTORY OF PRESENT ILLNESS
[Continuous Glucose Monitoring] : Continuous Glucose Monitoring: Yes [Isabella] : Isabella [FreeTextEntry1] : Quality:  Type 2.   \par Severity:  Moderate (MORD) IR 2.1\par Duration:  14 years\par Associated Symptoms:   \par Notes:  Current regimen:\par 	glipizide ER 5 daily\par 	metformin  4/day\par 	victoza 1.8 - tried to stop victoza but did not feel well\par 	humalog variable dosing; around 10-18 units before meals (5 pens every 6 weeks) - now requiring much lower amounts\par \par elevated glucose overnight; somewhat improved when injecting humalog HS\par \par Blood Glucose Testing Information \par \par Patient is using the  meter and is testing 6-7 times per day. Using a valerio,\par \par \par Past Medical History\par Arthritis. \par Notes:  ASHD, CABG, MI. Low ejection fraction; advised to have defibrillator (refused by pt). Ejection fraction improving. Followed by Dr. Odell\par hypothyroid on .137 daily\par BPH\par has cut down on quinapril from 20 bid to 1/2 of a 20 mg pill once daily\par  [FreeTextEntry2] : 95 [FreeTextEntry3] : 3 [FreeTextEntry4] : 2 [de-identified] : 6.1 [FreeTextEntry5] : 116 [FreeTextEntry6] : 24.4

## 2022-07-26 ENCOUNTER — NON-APPOINTMENT (OUTPATIENT)
Age: 77
End: 2022-07-26

## 2022-07-29 ENCOUNTER — RX RENEWAL (OUTPATIENT)
Age: 77
End: 2022-07-29

## 2022-09-02 ENCOUNTER — RX RENEWAL (OUTPATIENT)
Age: 77
End: 2022-09-02

## 2022-09-08 ENCOUNTER — RX RENEWAL (OUTPATIENT)
Age: 77
End: 2022-09-08

## 2022-11-22 LAB
HBA1C MFR BLD HPLC: 6.3
LDLC SERPL DIRECT ASSAY-MCNC: 56
MICROALBUMIN/CREAT 24H UR-RTO: 3
TSH SERPL-ACNC: 2.21

## 2022-11-23 ENCOUNTER — APPOINTMENT (OUTPATIENT)
Dept: ENDOCRINOLOGY | Facility: CLINIC | Age: 77
End: 2022-11-23

## 2022-11-23 VITALS
DIASTOLIC BLOOD PRESSURE: 66 MMHG | BODY MASS INDEX: 31.4 KG/M2 | SYSTOLIC BLOOD PRESSURE: 110 MMHG | HEIGHT: 69 IN | OXYGEN SATURATION: 99 % | WEIGHT: 212 LBS | HEART RATE: 68 BPM

## 2022-11-23 DIAGNOSIS — E11.65 TYPE 2 DIABETES MELLITUS WITH HYPERGLYCEMIA: ICD-10-CM

## 2022-11-23 LAB — GLUCOSE BLDC GLUCOMTR-MCNC: 203

## 2022-11-23 PROCEDURE — 82962 GLUCOSE BLOOD TEST: CPT

## 2022-11-23 PROCEDURE — 99214 OFFICE O/P EST MOD 30 MIN: CPT | Mod: 25

## 2022-11-23 PROCEDURE — 95251 CONT GLUC MNTR ANALYSIS I&R: CPT

## 2022-11-23 RX ORDER — METFORMIN ER 500 MG 500 MG/1
500 TABLET ORAL
Qty: 360 | Refills: 3 | Status: DISCONTINUED | COMMUNITY
Start: 2018-08-30 | End: 2022-11-23

## 2022-11-23 NOTE — HISTORY OF PRESENT ILLNESS
[Continuous Glucose Monitoring] : Continuous Glucose Monitoring: Yes [Isabella] : Isabella [FreeTextEntry1] : Quality:  Type 2.   \par Severity:  Moderate (MORD) IR 2.1\par Duration:  14 years\par Associated Symptoms:   \par Notes:  Current regimen:\par 	glipizide ER 5 daily\par 	metformin  4/day\par 	victoza 1.8 - tried to stop victoza but did not feel well\par 	humalog variable dosing; around 10-18 units before meals (5 pens every 6 weeks) - now requiring much lower amounts\par \par elevated glucose overnight; somewhat improved when injecting humalog HS\par \par Blood Glucose Testing Information \par \par Patient is using the  meter and is testing 6-7 times per day. Using a valerio,\par \par \par Past Medical History\par Arthritis. \par Notes:  ASHD, CABG, MI. Low ejection fraction; advised to have defibrillator (refused by pt). Ejection fraction improving. Followed by Dr. Odell\par hypothyroid on .137 daily\par BPH\par has cut down on quinapril from 20 bid to 1/2 of a 20 mg pill once daily\par  [FreeTextEntry2] : 77 [FreeTextEntry3] : 20 [FreeTextEntry4] : 1 [de-identified] : 6.8 [FreeTextEntry5] : 145 [FreeTextEntry6] : 30.2

## 2022-11-23 NOTE — ASSESSMENT
[FreeTextEntry1] : DM type 2, treated with insulin, orals, and glp-1, well controlled with no severe hypoglycemia. Cardiology recommending jardiance, and pt. wishes to avoid taking many pills; will start jardiance 10 and stop metformin\par Hyperlipidemia, controlled\par Hypothyroid, euthyroid on replacement\par Improving cbc on iron supplements; pt. refuses gi evaluation\par

## 2022-11-28 ENCOUNTER — RX RENEWAL (OUTPATIENT)
Age: 77
End: 2022-11-28

## 2022-12-14 ENCOUNTER — RX RENEWAL (OUTPATIENT)
Age: 77
End: 2022-12-14

## 2023-02-06 ENCOUNTER — RX RENEWAL (OUTPATIENT)
Age: 78
End: 2023-02-06

## 2023-02-06 RX ORDER — BLOOD SUGAR DIAGNOSTIC
STRIP MISCELLANEOUS
Qty: 600 | Refills: 3 | Status: ACTIVE | COMMUNITY
Start: 2018-09-11 | End: 1900-01-01

## 2023-02-22 ENCOUNTER — RX RENEWAL (OUTPATIENT)
Age: 78
End: 2023-02-22

## 2023-02-23 ENCOUNTER — RX RENEWAL (OUTPATIENT)
Age: 78
End: 2023-02-23

## 2023-02-28 ENCOUNTER — RX RENEWAL (OUTPATIENT)
Age: 78
End: 2023-02-28

## 2023-03-17 LAB
HBA1C MFR BLD HPLC: 6.5
LDLC SERPL DIRECT ASSAY-MCNC: 82
MICROALBUMIN/CREAT 24H UR-RTO: 6

## 2023-03-20 ENCOUNTER — APPOINTMENT (OUTPATIENT)
Dept: ENDOCRINOLOGY | Facility: CLINIC | Age: 78
End: 2023-03-20
Payer: MEDICARE

## 2023-03-20 VITALS
HEIGHT: 69 IN | BODY MASS INDEX: 31.4 KG/M2 | OXYGEN SATURATION: 98 % | DIASTOLIC BLOOD PRESSURE: 70 MMHG | HEART RATE: 62 BPM | WEIGHT: 212 LBS | SYSTOLIC BLOOD PRESSURE: 110 MMHG

## 2023-03-20 LAB — GLUCOSE BLDC GLUCOMTR-MCNC: 261

## 2023-03-20 PROCEDURE — 99214 OFFICE O/P EST MOD 30 MIN: CPT | Mod: 25

## 2023-03-20 PROCEDURE — 95251 CONT GLUC MNTR ANALYSIS I&R: CPT

## 2023-03-20 NOTE — ASSESSMENT
[FreeTextEntry1] : DM type 2, treated with insulin, orals, and glp-1, well controlled with no severe hypoglycemia.\par Feels better since change from metformin, although has noted worsening pp hyperglycemia; will increase jardiance to 25 mg\par Hyperlipidemia, controlled\par Hypothyroid, euthyroid on replacement\par \par

## 2023-03-20 NOTE — HISTORY OF PRESENT ILLNESS
[Continuous Glucose Monitoring] : Continuous Glucose Monitoring: Yes [Isabella] : Isabella [FreeTextEntry1] : Quality:  Type 2.   \par Severity:  Moderate (MORD) IR 2.1\par Duration:  15 years\par Associated Symptoms:   \par Notes:  Current regimen:\par 	glipizide ER 5 daily\par 	metformin  4/day - stopped\par                 jardiance 10\par 	victoza 1.8 - tried to stop victoza but did not feel well\par 	humalog variable dosing; around 10-18 units before meals (5 pens every 6 weeks) - now requiring much lower amounts\par \par elevated glucose overnight; somewhat improved when injecting humalog HS\par \par Blood Glucose Testing Information \par \par Patient is using the  meter and is testing 6-7 times per day. Using a valerio,\par \par \par Past Medical History\par Arthritis. \par Notes:  ASHD, CABG, MI. Low ejection fraction; advised to have defibrillator (refused by pt). Ejection fraction improving. Followed by Dr. Odell\par hypothyroid on .137 daily\par BPH\par has cut down on quinapril from 20 bid to 1/2 of a 20 mg pill once daily\par  [FreeTextEntry2] : 81 [FreeTextEntry3] : 18 [de-identified] : 6.8 [FreeTextEntry4] : 1 [FreeTextEntry5] : 147 [FreeTextEntry6] : 29.1 Post-Care Instructions: I reviewed with the patient in detail post-care instructions. Patient is to wear sunprotection, and avoid picking at any of the treated lesions. Pt may apply Vaseline to crusted or scabbing areas. Include Z78.9 (Other Specified Conditions Influencing Health Status) As An Associated Diagnosis?: No Medical Necessity Information: It is in your best interest to select a reason for this procedure from the list below. All of these items fulfill various CMS LCD requirements except the new and changing color options. Anesthesia Volume In Cc: 0.2 Consent: The patient's consent was obtained including but not limited to risks of crusting, scabbing, blistering, scarring, darker or lighter pigmentary change, recurrence, incomplete removal and infection. Detail Level: Zone Medical Necessity Clause: This procedure was medically necessary,lesion on husbands anterior scalp. Sk irritated Total Number Of Lesions Treated: 12

## 2023-04-12 ENCOUNTER — RX RENEWAL (OUTPATIENT)
Age: 78
End: 2023-04-12

## 2023-07-04 ENCOUNTER — RX RENEWAL (OUTPATIENT)
Age: 78
End: 2023-07-04

## 2023-07-04 RX ORDER — SACUBITRIL AND VALSARTAN 24; 26 MG/1; MG/1
24-26 TABLET, FILM COATED ORAL TWICE DAILY
Qty: 180 | Refills: 3 | Status: ACTIVE | COMMUNITY
Start: 2022-06-15 | End: 1900-01-01

## 2023-07-19 LAB
HBA1C MFR BLD HPLC: 6.7
LDLC SERPL DIRECT ASSAY-MCNC: NORMAL
MICROALBUMIN/CREAT 24H UR-RTO: 3

## 2023-07-20 ENCOUNTER — APPOINTMENT (OUTPATIENT)
Dept: ENDOCRINOLOGY | Facility: CLINIC | Age: 78
End: 2023-07-20
Payer: MEDICARE

## 2023-07-20 VITALS
DIASTOLIC BLOOD PRESSURE: 70 MMHG | BODY MASS INDEX: 31.7 KG/M2 | HEART RATE: 63 BPM | OXYGEN SATURATION: 98 % | HEIGHT: 69 IN | WEIGHT: 214 LBS | SYSTOLIC BLOOD PRESSURE: 110 MMHG

## 2023-07-20 LAB — GLUCOSE BLDC GLUCOMTR-MCNC: 216

## 2023-07-20 PROCEDURE — 99214 OFFICE O/P EST MOD 30 MIN: CPT | Mod: 25

## 2023-07-20 PROCEDURE — 95251 CONT GLUC MNTR ANALYSIS I&R: CPT

## 2023-07-20 RX ORDER — FLASH GLUCOSE SCANNING READER
EACH MISCELLANEOUS
Qty: 1 | Refills: 0 | Status: ACTIVE | COMMUNITY
Start: 2023-07-20 | End: 1900-01-01

## 2023-07-20 RX ORDER — FLASH GLUCOSE SENSOR
KIT MISCELLANEOUS
Qty: 6 | Refills: 5 | Status: ACTIVE | COMMUNITY
Start: 2023-07-20 | End: 1900-01-01

## 2023-07-20 NOTE — HISTORY OF PRESENT ILLNESS
[Continuous Glucose Monitoring] : Continuous Glucose Monitoring: Yes [Isabella] : Isabella [FreeTextEntry1] : Quality:  Type 2.   \par Severity:  Moderate (MORD) IR 2.1\par Duration:  15 years\par Associated Symptoms:   \par Notes:  Current regimen:\par 	glipizide ER 5 daily\par 	metformin  4/day - stopped\par                 jardiance 25\par 	victoza 1.8 - tried to stop victoza but did not feel well\par 	humalog variable dosing; around 10-18 units before meals (5 pens every 6 weeks) - now requiring much lower amounts\par \par elevated glucose overnight; somewhat improved when injecting humalog HS\par \par Blood Glucose Testing Information \par \par Patient is using the  meter and is testing 6-7 times per day. Using a valerio,\par \par \par Past Medical History\par Arthritis. \par Notes:  ASHD, CABG, MI. Low ejection fraction; advised to have defibrillator (refused by pt). Ejection fraction improving. Followed by Dr. Odell. Pt. will vary his furosemide dose PRN\par hypothyroid on .137 daily\par BPH\par has cut down on quinapril from 20 bid to 1/2 of a 20 mg pill once daily\par  [FreeTextEntry2] : 69 [FreeTextEntry3] : 28 [FreeTextEntry4] : 3 [de-identified] : 7.1 [FreeTextEntry5] : 157 [FreeTextEntry6] : 35.6

## 2023-07-20 NOTE — ASSESSMENT
[FreeTextEntry1] : DM type 2, treated with insulin, orals, and glp-1, well controlled with no severe hypoglycemia.\par Feels better since change from metformin, although has noted worsening pp hyperglycemia\par Hyperlipidemia, controlled\par Hypothyroid, euthyroid on replacement\par mild decline in renal function; will monitor\par \par Glucose Sensor Necessity:  This patient with diabetes (dx: E11.65) has been performing 4 glucose checks per day for the last 60 days utilizing a home blood glucose monitor. The patient is treated with insulin via four injections daily.  This patient requires frequent adjustments to his insulin treatment on the basis of therapeutic continuous glucose monitoring results by  adjusting fixed doses.  In addition, the patient has been to our office for an evaluation of his diabetes control within the past 6 months.  \par \par \par \par \par

## 2023-08-01 RX ORDER — ATORVASTATIN CALCIUM 40 MG/1
40 TABLET, FILM COATED ORAL
Qty: 90 | Refills: 3 | Status: ACTIVE | COMMUNITY
Start: 2018-10-28 | End: 1900-01-01

## 2023-11-10 LAB
HBA1C MFR BLD HPLC: 6.4
LDLC SERPL DIRECT ASSAY-MCNC: 74
MICROALBUMIN/CREAT 24H UR-RTO: 2
TSH SERPL-ACNC: 2.09

## 2023-11-13 ENCOUNTER — APPOINTMENT (OUTPATIENT)
Dept: ENDOCRINOLOGY | Facility: CLINIC | Age: 78
End: 2023-11-13
Payer: MEDICARE

## 2023-11-13 VITALS
BODY MASS INDEX: 35.03 KG/M2 | OXYGEN SATURATION: 99 % | WEIGHT: 218 LBS | SYSTOLIC BLOOD PRESSURE: 118 MMHG | HEART RATE: 61 BPM | HEIGHT: 66 IN | DIASTOLIC BLOOD PRESSURE: 60 MMHG

## 2023-11-13 DIAGNOSIS — E78.2 MIXED HYPERLIPIDEMIA: ICD-10-CM

## 2023-11-13 LAB — GLUCOSE BLDC GLUCOMTR-MCNC: 210

## 2023-11-13 PROCEDURE — 99214 OFFICE O/P EST MOD 30 MIN: CPT | Mod: 25

## 2023-11-13 PROCEDURE — 82962 GLUCOSE BLOOD TEST: CPT

## 2023-11-13 RX ORDER — RANOLAZINE 500 MG/1
500 TABLET, EXTENDED RELEASE ORAL
Refills: 0 | Status: ACTIVE | COMMUNITY

## 2023-11-21 ENCOUNTER — RX RENEWAL (OUTPATIENT)
Age: 78
End: 2023-11-21

## 2023-11-21 RX ORDER — ISOSORBIDE MONONITRATE 30 MG/1
30 TABLET, EXTENDED RELEASE ORAL
Qty: 90 | Refills: 3 | Status: ACTIVE | COMMUNITY
Start: 2018-10-28 | End: 1900-01-01

## 2024-01-09 ENCOUNTER — OFFICE (OUTPATIENT)
Dept: URBAN - METROPOLITAN AREA CLINIC 105 | Facility: CLINIC | Age: 79
Setting detail: OPHTHALMOLOGY
End: 2024-01-09
Payer: MEDICARE

## 2024-01-09 DIAGNOSIS — H35.373: ICD-10-CM

## 2024-01-09 DIAGNOSIS — Z96.1: ICD-10-CM

## 2024-01-09 DIAGNOSIS — H11.153: ICD-10-CM

## 2024-01-09 DIAGNOSIS — H21.563: ICD-10-CM

## 2024-01-09 DIAGNOSIS — E11.9: ICD-10-CM

## 2024-01-09 DIAGNOSIS — H40.033: ICD-10-CM

## 2024-01-09 PROCEDURE — 92014 COMPRE OPH EXAM EST PT 1/>: CPT | Performed by: OPTOMETRIST

## 2024-01-09 PROCEDURE — 92134 CPTRZ OPH DX IMG PST SGM RTA: CPT | Performed by: OPTOMETRIST

## 2024-01-09 ASSESSMENT — REFRACTION_MANIFEST
OD_VA1: 20/25
OS_SPHERE: -3.00
OS_VA1: 20/40-
OD_AXIS: 121
OS_VA1: 20/50
OD_CYLINDER: -1.50
OD_SPHERE: -0.50
OS_SPHERE: -0.75
OD_CYLINDER: -1.50
OS_AXIS: 090
OS_CYLINDER: -1.00
OS_CYLINDER: -1.00
OD_AXIS: 115
OD_SPHERE: PLANO
OS_AXIS: 100
OD_VA1: 20/30-

## 2024-01-09 ASSESSMENT — CONFRONTATIONAL VISUAL FIELD TEST (CVF)
OS_FINDINGS: FULL
OD_FINDINGS: FULL

## 2024-01-09 ASSESSMENT — REFRACTION_AUTOREFRACTION
OD_AXIS: 084
OS_AXIS: 080
OS_CYLINDER: -1.00
OS_SPHERE: +0.75
OD_CYLINDER: -1.25
OD_SPHERE: +1.00

## 2024-01-09 ASSESSMENT — REFRACTION_CURRENTRX
OD_SPHERE: +2.00
OD_OVR_VA: 20/
OS_OVR_VA: 20/
OS_SPHERE: +2.00

## 2024-01-09 ASSESSMENT — SPHEQUIV_DERIVED
OS_SPHEQUIV: 0.25
OD_SPHEQUIV: 0.375
OD_SPHEQUIV: -1.25
OS_SPHEQUIV: -1.25
OS_SPHEQUIV: -3.5

## 2024-02-16 ENCOUNTER — RX RENEWAL (OUTPATIENT)
Age: 79
End: 2024-02-16

## 2024-02-16 RX ORDER — EMPAGLIFLOZIN 25 MG/1
25 TABLET, FILM COATED ORAL DAILY
Qty: 90 | Refills: 3 | Status: ACTIVE | COMMUNITY
Start: 2022-11-23 | End: 1900-01-01

## 2024-02-16 RX ORDER — LEVOTHYROXINE SODIUM 0.14 MG/1
137 TABLET ORAL
Qty: 90 | Refills: 3 | Status: ACTIVE | COMMUNITY
Start: 2019-04-08 | End: 1900-01-01

## 2024-02-22 ENCOUNTER — RX RENEWAL (OUTPATIENT)
Age: 79
End: 2024-02-22

## 2024-02-22 RX ORDER — INSULIN LISPRO 100 [IU]/ML
100 INJECTION, SOLUTION INTRAVENOUS; SUBCUTANEOUS
Qty: 75 | Refills: 2 | Status: ACTIVE | COMMUNITY
Start: 2021-11-15 | End: 1900-01-01

## 2024-02-29 ENCOUNTER — RX RENEWAL (OUTPATIENT)
Age: 79
End: 2024-02-29

## 2024-02-29 RX ORDER — TAMSULOSIN HYDROCHLORIDE 0.4 MG/1
0.4 CAPSULE ORAL
Qty: 90 | Refills: 3 | Status: ACTIVE | COMMUNITY
Start: 2019-04-08 | End: 1900-01-01

## 2024-03-04 ENCOUNTER — RX RENEWAL (OUTPATIENT)
Age: 79
End: 2024-03-04

## 2024-03-04 RX ORDER — GLIPIZIDE 5 MG/1
5 TABLET, FILM COATED, EXTENDED RELEASE ORAL
Qty: 90 | Refills: 3 | Status: ACTIVE | COMMUNITY
Start: 2019-07-01 | End: 1900-01-01

## 2024-03-08 ENCOUNTER — APPOINTMENT (OUTPATIENT)
Dept: ENDOCRINOLOGY | Facility: CLINIC | Age: 79
End: 2024-03-08
Payer: MEDICARE

## 2024-03-08 VITALS
BODY MASS INDEX: 34.07 KG/M2 | WEIGHT: 212 LBS | SYSTOLIC BLOOD PRESSURE: 110 MMHG | OXYGEN SATURATION: 98 % | HEART RATE: 92 BPM | DIASTOLIC BLOOD PRESSURE: 70 MMHG | HEIGHT: 66 IN

## 2024-03-08 DIAGNOSIS — E03.9 HYPOTHYROIDISM, UNSPECIFIED: ICD-10-CM

## 2024-03-08 DIAGNOSIS — E11.9 TYPE 2 DIABETES MELLITUS W/OUT COMPLICATIONS: ICD-10-CM

## 2024-03-08 DIAGNOSIS — E78.00 PURE HYPERCHOLESTEROLEMIA, UNSPECIFIED: ICD-10-CM

## 2024-03-08 LAB
GLUCOSE BLDC GLUCOMTR-MCNC: 217
HBA1C MFR BLD HPLC: 6.6
LDLC SERPL CALC-MCNC: 79
MICROALBUMIN/CREAT 24H UR-RTO: 3
TSH SERPL-ACNC: 2.8

## 2024-03-08 PROCEDURE — G2211 COMPLEX E/M VISIT ADD ON: CPT

## 2024-03-08 PROCEDURE — 82962 GLUCOSE BLOOD TEST: CPT

## 2024-03-08 PROCEDURE — 99214 OFFICE O/P EST MOD 30 MIN: CPT

## 2024-03-08 PROCEDURE — 95251 CONT GLUC MNTR ANALYSIS I&R: CPT

## 2024-03-08 RX ORDER — SEMAGLUTIDE 1.34 MG/ML
4 INJECTION, SOLUTION SUBCUTANEOUS
Qty: 3 | Refills: 3 | Status: ACTIVE | COMMUNITY
Start: 2024-03-08 | End: 1900-01-01

## 2024-03-08 RX ORDER — LIRAGLUTIDE 6 MG/ML
18 INJECTION SUBCUTANEOUS
Qty: 27 | Refills: 3 | Status: DISCONTINUED | COMMUNITY
Start: 2019-04-08 | End: 2024-03-08

## 2024-03-08 NOTE — ASSESSMENT
[FreeTextEntry1] : DM type 2, stable control with no severe hypoglycemia. Switch from victoza to ozempic as requested by insurance. Upgrade to valerio 3 Hypothyroid, euthyroid on replacement hyperlipidemia, on therapy

## 2024-03-08 NOTE — HISTORY OF PRESENT ILLNESS
[Continuous Glucose Monitoring] : Continuous Glucose Monitoring: Yes [Isabella] : Isabella [FreeTextEntry1] : Quality:  Type 2.    Severity:  Moderate (MORD) IR 2.1 Duration:  16 years Associated Symptoms:    Notes:  Current regimen: 	glipizide ER 5 daily 	metformin  4/day - stopped                 jardiance 25 	victoza 1.8 - tried to stop victoza but did not feel well 	humalog variable dosing; around 10-18 units before meals (5 pens every 6 weeks) - now requiring much lower amounts  elevated glucose overnight; somewhat improved when injecting humalog HS  Blood Glucose Testing Information   Patient is using the  meter and is testing 6-7 times per day. Using a valerio,   Past Medical History Arthritis.  Notes:  ASHD, CABG, MI. Low ejection fraction; advised to have defibrillator (refused by pt). Ejection fraction improving. Followed by Dr. Odell. Pt. will vary his furosemide dose PRN hypothyroid on .137 daily BPH has cut down on quinapril from 20 bid to 1/2 of a 20 mg pill once daily  [FreeTextEntry2] : 80 [FreeTextEntry3] : 10 [de-identified] : 6.9 [FreeTextEntry4] : 1 [FreeTextEntry6] : 24.9 [FreeTextEntry5] : 152

## 2024-03-13 ENCOUNTER — APPOINTMENT (OUTPATIENT)
Dept: ENDOCRINOLOGY | Facility: CLINIC | Age: 79
End: 2024-03-13

## 2024-04-01 ENCOUNTER — NON-APPOINTMENT (OUTPATIENT)
Age: 79
End: 2024-04-01

## 2024-04-01 DIAGNOSIS — B35.1 TINEA UNGUIUM: ICD-10-CM

## 2024-04-30 ENCOUNTER — RX RENEWAL (OUTPATIENT)
Age: 79
End: 2024-04-30

## 2024-04-30 RX ORDER — FUROSEMIDE 20 MG/1
20 TABLET ORAL
Qty: 90 | Refills: 3 | Status: ACTIVE | COMMUNITY
Start: 2021-08-04 | End: 1900-01-01

## 2024-06-24 ENCOUNTER — APPOINTMENT (OUTPATIENT)
Dept: PODIATRY | Facility: CLINIC | Age: 79
End: 2024-06-24
Payer: MEDICARE

## 2024-06-24 DIAGNOSIS — M79.675 PAIN IN LEFT TOE(S): ICD-10-CM

## 2024-06-24 DIAGNOSIS — M79.674 PAIN IN RIGHT TOE(S): ICD-10-CM

## 2024-06-24 DIAGNOSIS — E11.9 TYPE 2 DIABETES MELLITUS W/OUT COMPLICATIONS: ICD-10-CM

## 2024-06-24 DIAGNOSIS — B35.1 TINEA UNGUIUM: ICD-10-CM

## 2024-06-24 PROCEDURE — 11721 DEBRIDE NAIL 6 OR MORE: CPT

## 2024-06-26 PROBLEM — M79.675 PAIN OF TOE OF LEFT FOOT: Status: ACTIVE | Noted: 2024-04-01

## 2024-06-26 PROBLEM — B35.1 ONYCHOMYCOSIS DUE TO TRICHOPHYTON RUBRUM: Status: ACTIVE | Noted: 2024-04-01

## 2024-06-26 PROBLEM — M79.674 PAIN OF TOE OF RIGHT FOOT: Status: ACTIVE | Noted: 2024-04-01

## 2024-06-26 PROBLEM — E11.9 CONTROLLED TYPE 2 DIABETES MELLITUS WITHOUT COMPLICATION: Status: ACTIVE | Noted: 2019-01-10

## 2024-07-08 ENCOUNTER — RX RENEWAL (OUTPATIENT)
Age: 79
End: 2024-07-08

## 2024-07-19 LAB
HBA1C MFR BLD HPLC: 6.5
LDLC SERPL DIRECT ASSAY-MCNC: NORMAL
MICROALBUMIN/CREAT 24H UR-RTO: 2
TSH SERPL-ACNC: 2.71

## 2024-07-22 ENCOUNTER — APPOINTMENT (OUTPATIENT)
Dept: ENDOCRINOLOGY | Facility: CLINIC | Age: 79
End: 2024-07-22

## 2024-07-22 ENCOUNTER — APPOINTMENT (OUTPATIENT)
Dept: ENDOCRINOLOGY | Facility: CLINIC | Age: 79
End: 2024-07-22
Payer: MEDICARE

## 2024-07-22 VITALS
OXYGEN SATURATION: 95 % | HEART RATE: 61 BPM | HEIGHT: 66 IN | BODY MASS INDEX: 32.78 KG/M2 | DIASTOLIC BLOOD PRESSURE: 60 MMHG | SYSTOLIC BLOOD PRESSURE: 110 MMHG | WEIGHT: 204 LBS

## 2024-07-22 DIAGNOSIS — E78.2 MIXED HYPERLIPIDEMIA: ICD-10-CM

## 2024-07-22 DIAGNOSIS — E11.9 TYPE 2 DIABETES MELLITUS W/OUT COMPLICATIONS: ICD-10-CM

## 2024-07-22 LAB — GLUCOSE BLDC GLUCOMTR-MCNC: 204

## 2024-07-22 PROCEDURE — 99214 OFFICE O/P EST MOD 30 MIN: CPT

## 2024-07-22 PROCEDURE — 82962 GLUCOSE BLOOD TEST: CPT

## 2024-07-22 PROCEDURE — 95251 CONT GLUC MNTR ANALYSIS I&R: CPT

## 2024-07-22 PROCEDURE — G2211 COMPLEX E/M VISIT ADD ON: CPT

## 2024-07-22 NOTE — HISTORY OF PRESENT ILLNESS
[Continuous Glucose Monitoring] : Continuous Glucose Monitoring: Yes [Isabella] : Isabella [FreeTextEntry1] : Quality:  Type 2.    Severity:  Moderate (MORD) IR 2.1 Duration:  16 years Associated Symptoms:    Notes:  Current regimen: 	glipizide ER 5 daily 	metformin  4/day - stopped                 jardiance 25 	victoza 1.8 - tried to stop victoza but did not feel well. Switched to ozempic  	humalog variable dosing; around 10-18 units before meals (5 pens every 6 weeks) - now requiring much lower amounts  elevated glucose overnight; somewhat improved when injecting humalog HS  Blood Glucose Testing Information   Patient is using the  meter and is testing 6-7 times per day. Using a valerio,   Past Medical History Arthritis.  Notes:  ASHD, CABG, MI. Low ejection fraction; advised to have defibrillator (refused by pt). Ejection fraction improving. Followed by Dr. Odell. Pt. will vary his furosemide dose PRN hypothyroid on .137 daily BPH c/o transient postural dizziness and attributing this to ozempic and/or jardiance [FreeTextEntry2] : 82 [FreeTextEntry3] : 17 [FreeTextEntry4] : 1 [de-identified] : 6.8 [FreeTextEntry5] : 146 [FreeTextEntry6] : 25.3

## 2024-07-22 NOTE — ASSESSMENT
[FreeTextEntry1] : DM type 2, stable control with no severe hypoglycemia. Decrease jardiance to 10 mg, and follow up with cardiology Hypothyroid, euthyroid on replacement hyperlipidemia, on therapy

## 2024-11-06 ENCOUNTER — APPOINTMENT (OUTPATIENT)
Dept: PODIATRY | Facility: CLINIC | Age: 79
End: 2024-11-06
Payer: MEDICARE

## 2024-11-06 DIAGNOSIS — B35.1 TINEA UNGUIUM: ICD-10-CM

## 2024-11-06 DIAGNOSIS — M79.674 PAIN IN RIGHT TOE(S): ICD-10-CM

## 2024-11-06 DIAGNOSIS — M79.675 PAIN IN LEFT TOE(S): ICD-10-CM

## 2024-11-06 PROCEDURE — 11721 DEBRIDE NAIL 6 OR MORE: CPT

## 2024-11-11 ENCOUNTER — APPOINTMENT (OUTPATIENT)
Dept: ENDOCRINOLOGY | Facility: CLINIC | Age: 79
End: 2024-11-11
Payer: MEDICARE

## 2024-11-11 VITALS
HEIGHT: 66 IN | DIASTOLIC BLOOD PRESSURE: 70 MMHG | OXYGEN SATURATION: 97 % | HEART RATE: 66 BPM | BODY MASS INDEX: 30.86 KG/M2 | SYSTOLIC BLOOD PRESSURE: 110 MMHG | WEIGHT: 192 LBS

## 2024-11-11 DIAGNOSIS — E03.9 HYPOTHYROIDISM, UNSPECIFIED: ICD-10-CM

## 2024-11-11 DIAGNOSIS — E78.00 PURE HYPERCHOLESTEROLEMIA, UNSPECIFIED: ICD-10-CM

## 2024-11-11 DIAGNOSIS — E11.9 TYPE 2 DIABETES MELLITUS W/OUT COMPLICATIONS: ICD-10-CM

## 2024-11-11 DIAGNOSIS — E78.2 MIXED HYPERLIPIDEMIA: ICD-10-CM

## 2024-11-11 LAB — GLUCOSE BLDC GLUCOMTR-MCNC: 189

## 2024-11-11 PROCEDURE — 82962 GLUCOSE BLOOD TEST: CPT

## 2024-11-11 PROCEDURE — 95251 CONT GLUC MNTR ANALYSIS I&R: CPT

## 2024-11-11 PROCEDURE — G2211 COMPLEX E/M VISIT ADD ON: CPT

## 2024-11-11 PROCEDURE — 99214 OFFICE O/P EST MOD 30 MIN: CPT

## 2024-11-11 RX ORDER — BLOOD-GLUCOSE SENSOR
EACH MISCELLANEOUS
Refills: 0 | Status: ACTIVE | COMMUNITY

## 2025-01-07 ENCOUNTER — OFFICE (OUTPATIENT)
Dept: URBAN - METROPOLITAN AREA CLINIC 105 | Facility: CLINIC | Age: 80
Setting detail: OPHTHALMOLOGY
End: 2025-01-07
Payer: MEDICARE

## 2025-01-07 DIAGNOSIS — H35.373: ICD-10-CM

## 2025-01-07 DIAGNOSIS — H11.153: ICD-10-CM

## 2025-01-07 DIAGNOSIS — H40.033: ICD-10-CM

## 2025-01-07 DIAGNOSIS — H21.563: ICD-10-CM

## 2025-01-07 PROCEDURE — 92134 CPTRZ OPH DX IMG PST SGM RTA: CPT | Performed by: OPTOMETRIST

## 2025-01-07 PROCEDURE — 92014 COMPRE OPH EXAM EST PT 1/>: CPT | Performed by: OPTOMETRIST

## 2025-01-07 ASSESSMENT — REFRACTION_MANIFEST
OS_AXIS: 100
OD_CYLINDER: -1.50
OD_SPHERE: PLANO
OD_VA1: 20/25
OS_CYLINDER: -1.00
OS_AXIS: 090
OD_SPHERE: -0.50
OD_CYLINDER: -1.50
OD_VA1: 20/30-
OS_VA1: 20/40-
OD_AXIS: 121
OS_CYLINDER: -1.00
OD_AXIS: 115
OS_SPHERE: -0.75
OS_SPHERE: -3.00
OS_VA1: 20/50

## 2025-01-07 ASSESSMENT — PACHYMETRY
OD_CT_CORRECTION: -6
OS_CT_CORRECTION: -6
OS_CT_UM: 626
OD_CT_UM: 620

## 2025-01-07 ASSESSMENT — TONOMETRY
OS_IOP_MMHG: 15
OD_IOP_MMHG: 15

## 2025-01-07 ASSESSMENT — REFRACTION_CURRENTRX
OD_SPHERE: +2.00
OD_OVR_VA: 20/
OS_OVR_VA: 20/
OS_SPHERE: +2.00

## 2025-01-07 ASSESSMENT — VISUAL ACUITY
OD_BCVA: 20/20
OS_BCVA: 20/20

## 2025-01-07 ASSESSMENT — KERATOMETRY
OD_AXISANGLE_DEGREES: 036
OS_K1POWER_DIOPTERS: 42.75
OD_K2POWER_DIOPTERS: 43.25
OS_K2POWER_DIOPTERS: 43.00
OD_K1POWER_DIOPTERS: 42.75
OS_AXISANGLE_DEGREES: 131

## 2025-01-07 ASSESSMENT — REFRACTION_AUTOREFRACTION
OS_SPHERE: +0.75
OD_CYLINDER: -1.25
OS_AXIS: 080
OD_SPHERE: +1.00
OS_CYLINDER: -1.00
OD_AXIS: 084

## 2025-01-07 ASSESSMENT — CONFRONTATIONAL VISUAL FIELD TEST (CVF)
OS_FINDINGS: FULL
OD_FINDINGS: FULL

## 2025-03-20 ENCOUNTER — APPOINTMENT (OUTPATIENT)
Dept: ENDOCRINOLOGY | Facility: CLINIC | Age: 80
End: 2025-03-20
Payer: MEDICARE

## 2025-03-20 VITALS
OXYGEN SATURATION: 97 % | BODY MASS INDEX: 32.47 KG/M2 | HEIGHT: 66 IN | HEART RATE: 65 BPM | WEIGHT: 202 LBS | DIASTOLIC BLOOD PRESSURE: 60 MMHG | SYSTOLIC BLOOD PRESSURE: 108 MMHG

## 2025-03-20 DIAGNOSIS — E03.9 HYPOTHYROIDISM, UNSPECIFIED: ICD-10-CM

## 2025-03-20 DIAGNOSIS — E78.2 MIXED HYPERLIPIDEMIA: ICD-10-CM

## 2025-03-20 DIAGNOSIS — E11.9 TYPE 2 DIABETES MELLITUS W/OUT COMPLICATIONS: ICD-10-CM

## 2025-03-20 LAB
GLUCOSE BLDC GLUCOMTR-MCNC: 183
HBA1C MFR BLD HPLC: 6.7
LDLC SERPL DIRECT ASSAY-MCNC: 84
TSH SERPL-ACNC: 4.87

## 2025-03-20 PROCEDURE — 82962 GLUCOSE BLOOD TEST: CPT

## 2025-03-20 PROCEDURE — 99214 OFFICE O/P EST MOD 30 MIN: CPT

## 2025-03-20 PROCEDURE — 95251 CONT GLUC MNTR ANALYSIS I&R: CPT

## 2025-03-20 PROCEDURE — G2211 COMPLEX E/M VISIT ADD ON: CPT

## 2025-03-27 ENCOUNTER — TRANSCRIPTION ENCOUNTER (OUTPATIENT)
Age: 80
End: 2025-03-27

## 2025-04-08 ENCOUNTER — APPOINTMENT (OUTPATIENT)
Dept: PODIATRY | Facility: CLINIC | Age: 80
End: 2025-04-08
Payer: MEDICARE

## 2025-04-08 DIAGNOSIS — B35.1 TINEA UNGUIUM: ICD-10-CM

## 2025-04-08 DIAGNOSIS — M79.674 PAIN IN RIGHT TOE(S): ICD-10-CM

## 2025-04-08 DIAGNOSIS — M79.675 PAIN IN LEFT TOE(S): ICD-10-CM

## 2025-04-08 PROCEDURE — 11721 DEBRIDE NAIL 6 OR MORE: CPT

## 2025-06-09 ENCOUNTER — TRANSCRIPTION ENCOUNTER (OUTPATIENT)
Age: 80
End: 2025-06-09

## 2025-07-18 ENCOUNTER — APPOINTMENT (OUTPATIENT)
Dept: ENDOCRINOLOGY | Facility: CLINIC | Age: 80
End: 2025-07-18
Payer: MEDICARE

## 2025-07-18 VITALS
HEIGHT: 66 IN | SYSTOLIC BLOOD PRESSURE: 118 MMHG | BODY MASS INDEX: 33.11 KG/M2 | WEIGHT: 206 LBS | DIASTOLIC BLOOD PRESSURE: 70 MMHG | OXYGEN SATURATION: 98 % | HEART RATE: 68 BPM

## 2025-07-18 LAB
GLUCOSE BLDC GLUCOMTR-MCNC: 148
HBA1C MFR BLD HPLC: 6.6
LDLC SERPL DIRECT ASSAY-MCNC: 91
MICROALBUMIN/CREAT 24H UR-RTO: 5
TSH SERPL-ACNC: 3.34

## 2025-07-18 PROCEDURE — G2211 COMPLEX E/M VISIT ADD ON: CPT

## 2025-07-18 PROCEDURE — 99214 OFFICE O/P EST MOD 30 MIN: CPT

## 2025-07-18 PROCEDURE — 82962 GLUCOSE BLOOD TEST: CPT

## 2025-08-12 ENCOUNTER — OFFICE (OUTPATIENT)
Dept: URBAN - METROPOLITAN AREA CLINIC 105 | Facility: CLINIC | Age: 80
Setting detail: OPHTHALMOLOGY
End: 2025-08-12
Payer: MEDICARE

## 2025-08-12 ENCOUNTER — APPOINTMENT (OUTPATIENT)
Dept: PODIATRY | Facility: CLINIC | Age: 80
End: 2025-08-12
Payer: MEDICARE

## 2025-08-12 DIAGNOSIS — M79.674 PAIN IN RIGHT TOE(S): ICD-10-CM

## 2025-08-12 DIAGNOSIS — H11.441: ICD-10-CM

## 2025-08-12 DIAGNOSIS — S99.929A UNSPECIFIED INJURY OF UNSPECIFIED FOOT, INITIAL ENCOUNTER: ICD-10-CM

## 2025-08-12 DIAGNOSIS — E11.9 TYPE 2 DIABETES MELLITUS W/OUT COMPLICATIONS: ICD-10-CM

## 2025-08-12 PROCEDURE — 99213 OFFICE O/P EST LOW 20 MIN: CPT

## 2025-08-12 PROCEDURE — 92285 EXTERNAL OCULAR PHOTOGRAPHY: CPT | Performed by: OPTOMETRIST

## 2025-08-12 PROCEDURE — 99213 OFFICE O/P EST LOW 20 MIN: CPT | Performed by: OPTOMETRIST

## 2025-08-12 ASSESSMENT — VISUAL ACUITY
OD_BCVA: 20/20
OS_BCVA: 20/20

## 2025-08-12 ASSESSMENT — REFRACTION_MANIFEST
OS_VA1: 20/40-
OS_SPHERE: -0.75
OD_CYLINDER: -1.50
OS_VA1: 20/50
OD_VA1: 20/30-
OS_CYLINDER: -1.00
OD_SPHERE: PLANO
OS_AXIS: 090
OD_VA1: 20/25
OS_CYLINDER: -1.00
OS_SPHERE: -3.00
OD_SPHERE: -0.50
OS_AXIS: 100
OD_CYLINDER: -1.50
OD_AXIS: 121
OD_AXIS: 115

## 2025-08-12 ASSESSMENT — REFRACTION_CURRENTRX
OS_OVR_VA: 20/
OS_SPHERE: +2.00
OD_SPHERE: +2.00
OD_OVR_VA: 20/

## 2025-08-12 ASSESSMENT — PACHYMETRY
OD_CT_CORRECTION: -6
OD_CT_UM: 620
OS_CT_UM: 626
OS_CT_CORRECTION: -6

## 2025-08-12 ASSESSMENT — CONFRONTATIONAL VISUAL FIELD TEST (CVF)
OD_FINDINGS: FULL
OS_FINDINGS: FULL

## 2025-08-14 PROBLEM — S99.929A INJURY OF NAIL BED OF TOE: Status: ACTIVE | Noted: 2025-08-14
